# Patient Record
Sex: MALE | Race: WHITE | NOT HISPANIC OR LATINO | Employment: FULL TIME | URBAN - METROPOLITAN AREA
[De-identification: names, ages, dates, MRNs, and addresses within clinical notes are randomized per-mention and may not be internally consistent; named-entity substitution may affect disease eponyms.]

---

## 2017-04-11 ENCOUNTER — RECORDS - HEALTHEAST (OUTPATIENT)
Dept: LAB | Facility: CLINIC | Age: 57
End: 2017-04-11

## 2017-04-11 LAB
CHOLEST SERPL-MCNC: 218 MG/DL
FASTING STATUS PATIENT QL REPORTED: YES
HDLC SERPL-MCNC: 44 MG/DL
LDLC SERPL CALC-MCNC: 151 MG/DL
TRIGL SERPL-MCNC: 115 MG/DL

## 2018-04-23 ENCOUNTER — RECORDS - HEALTHEAST (OUTPATIENT)
Dept: LAB | Facility: CLINIC | Age: 58
End: 2018-04-23

## 2018-04-23 LAB
ALBUMIN SERPL-MCNC: 3.6 G/DL (ref 3.5–5)
ALP SERPL-CCNC: 68 U/L (ref 45–120)
ALT SERPL W P-5'-P-CCNC: 21 U/L (ref 0–45)
ANION GAP SERPL CALCULATED.3IONS-SCNC: 11 MMOL/L (ref 5–18)
AST SERPL W P-5'-P-CCNC: 19 U/L (ref 0–40)
BILIRUB SERPL-MCNC: 1.4 MG/DL (ref 0–1)
BUN SERPL-MCNC: 25 MG/DL (ref 8–22)
CALCIUM SERPL-MCNC: 9.3 MG/DL (ref 8.5–10.5)
CHLORIDE BLD-SCNC: 110 MMOL/L (ref 98–107)
CHOLEST SERPL-MCNC: 188 MG/DL
CO2 SERPL-SCNC: 22 MMOL/L (ref 22–31)
CREAT SERPL-MCNC: 0.75 MG/DL (ref 0.7–1.3)
FASTING STATUS PATIENT QL REPORTED: NORMAL
GFR SERPL CREATININE-BSD FRML MDRD: >60 ML/MIN/1.73M2
GLUCOSE BLD-MCNC: 150 MG/DL (ref 70–125)
HDLC SERPL-MCNC: 51 MG/DL
LDLC SERPL CALC-MCNC: 120 MG/DL
POTASSIUM BLD-SCNC: 4.3 MMOL/L (ref 3.5–5)
PROT SERPL-MCNC: 6.3 G/DL (ref 6–8)
SODIUM SERPL-SCNC: 143 MMOL/L (ref 136–145)
TRIGL SERPL-MCNC: 86 MG/DL

## 2019-05-31 ENCOUNTER — RECORDS - HEALTHEAST (OUTPATIENT)
Dept: LAB | Facility: CLINIC | Age: 59
End: 2019-05-31

## 2019-05-31 LAB
ALBUMIN SERPL-MCNC: 3.9 G/DL (ref 3.5–5)
ALP SERPL-CCNC: 63 U/L (ref 45–120)
ALT SERPL W P-5'-P-CCNC: 14 U/L (ref 0–45)
AST SERPL W P-5'-P-CCNC: 13 U/L (ref 0–40)
BILIRUB DIRECT SERPL-MCNC: 0.8 MG/DL
BILIRUB SERPL-MCNC: 3.4 MG/DL (ref 0–1)
PROT SERPL-MCNC: 6.3 G/DL (ref 6–8)

## 2019-06-01 ENCOUNTER — ANESTHESIA - HEALTHEAST (OUTPATIENT)
Dept: SURGERY | Facility: HOSPITAL | Age: 59
End: 2019-06-01

## 2019-06-01 ENCOUNTER — SURGERY - HEALTHEAST (OUTPATIENT)
Dept: SURGERY | Facility: HOSPITAL | Age: 59
End: 2019-06-01

## 2019-06-01 LAB — BACTERIA SPEC CULT: NO GROWTH

## 2019-06-01 ASSESSMENT — MIFFLIN-ST. JEOR
SCORE: 1542.01
SCORE: 1600.97

## 2019-06-02 ASSESSMENT — MIFFLIN-ST. JEOR: SCORE: 1578.29

## 2019-06-06 ENCOUNTER — OFFICE VISIT - HEALTHEAST (OUTPATIENT)
Dept: SURGERY | Facility: CLINIC | Age: 59
End: 2019-06-06

## 2019-06-06 DIAGNOSIS — Z48.89 POSTOPERATIVE VISIT: ICD-10-CM

## 2019-06-13 ENCOUNTER — OFFICE VISIT - HEALTHEAST (OUTPATIENT)
Dept: SURGERY | Facility: CLINIC | Age: 59
End: 2019-06-13

## 2019-06-13 DIAGNOSIS — Z48.89 POSTOPERATIVE VISIT: ICD-10-CM

## 2019-12-23 ENCOUNTER — RECORDS - HEALTHEAST (OUTPATIENT)
Dept: LAB | Facility: CLINIC | Age: 59
End: 2019-12-23

## 2019-12-23 LAB — POTASSIUM BLD-SCNC: 3.9 MMOL/L (ref 3.5–5)

## 2020-09-01 ENCOUNTER — AMBULATORY - HEALTHEAST (OUTPATIENT)
Dept: ADMINISTRATIVE | Facility: CLINIC | Age: 60
End: 2020-09-01

## 2020-09-01 ENCOUNTER — RECORDS - HEALTHEAST (OUTPATIENT)
Dept: LAB | Facility: CLINIC | Age: 60
End: 2020-09-01

## 2020-09-01 DIAGNOSIS — K43.9 VENTRAL HERNIA WITHOUT OBSTRUCTION OR GANGRENE: ICD-10-CM

## 2020-09-01 LAB
ALBUMIN SERPL-MCNC: 3.8 G/DL (ref 3.5–5)
ALP SERPL-CCNC: 77 U/L (ref 45–120)
ALT SERPL W P-5'-P-CCNC: 14 U/L (ref 0–45)
ANION GAP SERPL CALCULATED.3IONS-SCNC: 9 MMOL/L (ref 5–18)
AST SERPL W P-5'-P-CCNC: 15 U/L (ref 0–40)
BILIRUB SERPL-MCNC: 0.9 MG/DL (ref 0–1)
BUN SERPL-MCNC: 18 MG/DL (ref 8–22)
CALCIUM SERPL-MCNC: 9.3 MG/DL (ref 8.5–10.5)
CHLORIDE BLD-SCNC: 107 MMOL/L (ref 98–107)
CHOLEST SERPL-MCNC: 212 MG/DL
CO2 SERPL-SCNC: 24 MMOL/L (ref 22–31)
CREAT SERPL-MCNC: 0.82 MG/DL (ref 0.7–1.3)
FASTING STATUS PATIENT QL REPORTED: ABNORMAL
GFR SERPL CREATININE-BSD FRML MDRD: >60 ML/MIN/1.73M2
GLUCOSE BLD-MCNC: 160 MG/DL (ref 70–125)
HDLC SERPL-MCNC: 55 MG/DL
LDLC SERPL CALC-MCNC: 141 MG/DL
POTASSIUM BLD-SCNC: 4.2 MMOL/L (ref 3.5–5)
PROT SERPL-MCNC: 6.3 G/DL (ref 6–8)
SODIUM SERPL-SCNC: 140 MMOL/L (ref 136–145)
TRIGL SERPL-MCNC: 81 MG/DL

## 2020-09-16 ENCOUNTER — AMBULATORY - HEALTHEAST (OUTPATIENT)
Dept: SURGERY | Facility: AMBULATORY SURGERY CENTER | Age: 60
End: 2020-09-16

## 2020-09-16 ENCOUNTER — OFFICE VISIT - HEALTHEAST (OUTPATIENT)
Dept: SURGERY | Facility: CLINIC | Age: 60
End: 2020-09-16

## 2020-09-16 DIAGNOSIS — K43.9 VENTRAL HERNIA WITHOUT OBSTRUCTION OR GANGRENE: ICD-10-CM

## 2020-09-16 DIAGNOSIS — Z11.59 ENCOUNTER FOR SCREENING FOR OTHER VIRAL DISEASES: ICD-10-CM

## 2020-09-21 ENCOUNTER — AMBULATORY - HEALTHEAST (OUTPATIENT)
Dept: FAMILY MEDICINE | Facility: CLINIC | Age: 60
End: 2020-09-21

## 2020-09-21 DIAGNOSIS — Z11.59 ENCOUNTER FOR SCREENING FOR OTHER VIRAL DISEASES: ICD-10-CM

## 2020-09-23 ENCOUNTER — COMMUNICATION - HEALTHEAST (OUTPATIENT)
Dept: SCHEDULING | Facility: CLINIC | Age: 60
End: 2020-09-23

## 2020-09-23 ENCOUNTER — ANESTHESIA - HEALTHEAST (OUTPATIENT)
Dept: SURGERY | Facility: AMBULATORY SURGERY CENTER | Age: 60
End: 2020-09-23

## 2020-09-23 ASSESSMENT — MIFFLIN-ST. JEOR: SCORE: 1551.18

## 2020-09-24 ENCOUNTER — SURGERY - HEALTHEAST (OUTPATIENT)
Dept: SURGERY | Facility: AMBULATORY SURGERY CENTER | Age: 60
End: 2020-09-24

## 2020-09-24 ASSESSMENT — MIFFLIN-ST. JEOR: SCORE: 1551.18

## 2021-04-27 ENCOUNTER — RECORDS - HEALTHEAST (OUTPATIENT)
Dept: LAB | Facility: CLINIC | Age: 61
End: 2021-04-27

## 2021-04-27 LAB
SARS-COV-2 PCR COMMENT: NORMAL
SARS-COV-2 RNA SPEC QL NAA+PROBE: NEGATIVE
SARS-COV-2 VIRUS SPECIMEN SOURCE: NORMAL

## 2021-05-28 ENCOUNTER — RECORDS - HEALTHEAST (OUTPATIENT)
Dept: ADMINISTRATIVE | Facility: CLINIC | Age: 61
End: 2021-05-28

## 2021-05-29 NOTE — ANESTHESIA CARE TRANSFER NOTE
Last vitals:   Vitals:    06/01/19 1930   BP: 129/79   Pulse: (!) 113   Resp: 24   Temp: 37.3  C (99.1  F)   SpO2: 100%     Patient's level of consciousness is drowsy  Spontaneous respirations: yes  Maintains airway independently: yes  Dentition unchanged: yes  Oropharynx: oropharynx clear of all foreign objects    QCDR Measures:  ASA# 20 - Surgical Safety Checklist: WHO surgical safety checklist completed prior to induction    PQRS# 430 - Adult PONV Prevention: 4558F - Pt received => 2 anti-emetic agents (different classes) preop & intraop  ASA# 8 - Peds PONV Prevention: NA - Not pediatric patient, not GA or 2 or more risk factors NOT present  PQRS# 424 - Almita-op Temp Management: 4559F - At least one body temp DOCUMENTED => 35.5C or 95.9F within required timeframe  PQRS# 426 - PACU Transfer Protocol: - Transfer of care checklist used  ASA# 14 - Acute Post-op Pain: ASA14B - Patient did NOT experience pain >= 7 out of 10

## 2021-05-29 NOTE — ANESTHESIA PREPROCEDURE EVALUATION
Anesthesia Evaluation      Patient summary reviewed   No history of anesthetic complications     Airway   Mallampati: III  Neck ROM: full   Pulmonary - negative ROS and normal exam    breath sounds clear to auscultation                         Cardiovascular - normal exam  Exercise tolerance: > or = 4 METS  ECG reviewed  Rhythm: regular  Rate: normal,         Neuro/Psych - negative ROS     Endo/Other - negative ROS      GI/Hepatic/Renal - negative ROS     Comments: Perforated appy     Other findings: Last at breakfast at 8am      Dental    (+) poor dentition and chipped                       Anesthesia Plan  Planned anesthetic: general endotracheal  -- RSI with Succ  -- PONV prophylaxis with Decadron 10 mg and Zofran 4 mg  -- toradol if ok with surgeon    ASA 2   Induction: intravenous   Anesthetic plan and risks discussed with: patient and spouse  Anesthesia plan special considerations: rapid sequence induction, antiemetics,   Post-op plan: routine recovery

## 2021-05-29 NOTE — ANESTHESIA POSTPROCEDURE EVALUATION
Patient: Bishnu Tam  APPENDECTOMY, LAPAROSCOPIC  Anesthesia type: general    Patient location: PACU  Last vitals:   Vitals Value Taken Time   /64 6/1/2019  9:42 PM   Temp 36.9  C (98.5  F) 6/1/2019  9:42 PM   Pulse 111 6/1/2019  9:42 PM   Resp 16 6/1/2019  9:42 PM   SpO2 95 % 6/1/2019  9:42 PM     Post vital signs: stable  Level of consciousness: awake and responds to simple questions  Post-anesthesia pain: pain controlled  Post-anesthesia nausea and vomiting: no  Pulmonary: unassisted, return to baseline  Cardiovascular: stable and blood pressure at baseline  Hydration: adequate  Anesthetic events: no    QCDR Measures:  ASA# 11 - Almita-op Cardiac Arrest: ASA11B - Patient did NOT experience unanticipated cardiac arrest  ASA# 12 - Almita-op Mortality Rate: ASA12B - Patient did NOT die  ASA# 13 - PACU Re-Intubation Rate: ASA13B - Patient did NOT require a new airway mgmt  ASA# 10 - Composite Anes Safety: ASA10A - No serious adverse event    Additional Notes:

## 2021-05-29 NOTE — PROGRESS NOTES
HPI: Pt is here for follow up of a appendectomy that was significant with necrosis.   he is doing well.  Pain is well controlled.  No difficulties with the surgical wound/wounds.  he is eating well and denies fever and chills.         /62 (Patient Site: Right Arm, Patient Position: Sitting, Cuff Size: Adult Small)   Pulse 66   Temp 97.5  F (36.4  C) (Tympanic)   Wt 159 lb (72.1 kg)   SpO2 98%   BMI 22.81 kg/m      EXAM:  GENERAL:Appears well  ABDOMEN:  Soft, +BS  SURGICAL WOUNDS:  Incisions healing well, no enduration or drainage.    Final Diagnosis   APPENDIX, APPENDECTOMY:     1) MARKED ACUTE APPENDICITIS AND PERIAPPENDICITIS WITH TRANSMURAL NECROSIS,         MESENTERIC ABSCESS FORMATION AND TRANSMURAL PERFORATION     2) NO EVIDENCE OF DYSPLASIA OR MALIGNANCY         Assessment/Plan: . Doing well after surgery and should follow up as needed.  Alycia Valente PA-C  [facility] Department of Surgery

## 2021-05-29 NOTE — PROGRESS NOTES
HPI: Pt is here for follow up to have his JOHN PAUL drain removed.  He reports that he has been emptying it regularly but is not sure how much is been coming out.  States the fluid has cleared up and is more yellow.  He is eating well and reports no issues with incisions. He denies fever, chills, nausea, vomiting, bowel issues, and urinary difficulties.      /64 (Patient Site: Right Arm, Patient Position: Sitting, Cuff Size: Adult Small)   Pulse 68   Temp 98.2  F (36.8  C)   Wt 169 lb (76.7 kg)   SpO2 97%   BMI 24.25 kg/m      EXAM:  GENERAL:Appears well  ABDOMEN:  Soft, +BS  SURGICAL WOUNDS:  Incisions healing well, no enduration or drainage; JOHN PAUL drain noted with small amount of serous fluid      Assessment/Plan: JOHN PAUL drain pulled without incident and patient tolerated well.  He should continue on his antibiotic and be seen as scheduled for a full postop visit next week.    Marion Greer , [facility]

## 2021-05-30 ENCOUNTER — RECORDS - HEALTHEAST (OUTPATIENT)
Dept: ADMINISTRATIVE | Facility: CLINIC | Age: 61
End: 2021-05-30

## 2021-05-31 ENCOUNTER — RECORDS - HEALTHEAST (OUTPATIENT)
Dept: ADMINISTRATIVE | Facility: CLINIC | Age: 61
End: 2021-05-31

## 2021-06-02 VITALS — BODY MASS INDEX: 24.25 KG/M2 | WEIGHT: 169 LBS

## 2021-06-02 VITALS — HEIGHT: 70 IN | BODY MASS INDEX: 24.05 KG/M2 | WEIGHT: 168 LBS

## 2021-06-03 VITALS — BODY MASS INDEX: 22.81 KG/M2 | WEIGHT: 159 LBS

## 2021-06-04 VITALS — HEIGHT: 70 IN | BODY MASS INDEX: 23.48 KG/M2 | WEIGHT: 164 LBS

## 2021-06-04 VITALS — WEIGHT: 167 LBS | SYSTOLIC BLOOD PRESSURE: 128 MMHG | BODY MASS INDEX: 23.96 KG/M2 | DIASTOLIC BLOOD PRESSURE: 74 MMHG

## 2021-06-11 NOTE — ANESTHESIA POSTPROCEDURE EVALUATION
Patient: Bishnu Tam  Procedure(s):  HERNIORRHAPHY, VENTRAL, LAPAROSCOPIC  Anesthesia type: general    Patient location: Phase II Recovery  Last vitals:   Vitals Value Taken Time   /64 9/24/2020  1:20 PM   Temp 36.4  C (97.6  F) 9/24/2020 12:18 PM   Pulse 68 9/24/2020  1:36 PM   Resp 16 9/24/2020 12:18 PM   SpO2 96 % 9/24/2020  1:36 PM   Vitals shown include unvalidated device data.  Post vital signs: stable  Level of consciousness: awake and responds to simple questions  Post-anesthesia pain: pain controlled  Post-anesthesia nausea and vomiting: no  Pulmonary: unassisted, return to baseline  Cardiovascular: stable and blood pressure at baseline  Hydration: adequate  Anesthetic events: no    QCDR Measures:  ASA# 11 - Almita-op Cardiac Arrest: ASA11B - Patient did NOT experience unanticipated cardiac arrest  ASA# 12 - Almita-op Mortality Rate: ASA12B - Patient did NOT die  ASA# 13 - PACU Re-Intubation Rate: ASA13B - Patient did NOT require a new airway mgmt  ASA# 10 - Composite Anes Safety: ASA10A - No serious adverse event    Additional Notes:

## 2021-06-11 NOTE — ANESTHESIA CARE TRANSFER NOTE
Last vitals:   Vitals:    09/24/20 1218   BP: 168/77   Pulse: 91   Resp: 16   Temp: 36.4  C (97.6  F)   SpO2: 99%     Patient's level of consciousness is drowsy  Spontaneous respirations: yes  Maintains airway independently: yes  Dentition unchanged: yes  Oropharynx: oropharynx clear of all foreign objects    QCDR Measures:  ASA# 20 - Surgical Safety Checklist: WHO surgical safety checklist completed prior to induction    PQRS# 430 - Adult PONV Prevention: 4558F - Pt received => 2 anti-emetic agents (different classes) preop & intraop  ASA# 8 - Peds PONV Prevention: NA - Not pediatric patient, not GA or 2 or more risk factors NOT present  PQRS# 424 - Almita-op Temp Management: 4559F - At least one body temp DOCUMENTED => 35.5C or 95.9F within required timeframe  PQRS# 426 - PACU Transfer Protocol: - Transfer of care checklist used  ASA# 14 - Acute Post-op Pain: ASA14B - Patient did NOT experience pain >= 7 out of 10

## 2021-06-11 NOTE — PROGRESS NOTES
GENERAL SURGICAL CONSULTATION    I was requested by Tonny Arthur MD to consult on this pt to evaluate them for a ventral hernia    HPI:  This is a 60 y.o. male here today with swelling just above the umbilicus for the last 6 months.  It does not hurt.  It does seem to be getting bigger.     Allergies:Patient has no known allergies.    Past Medical History:   Diagnosis Date     Appendicitis 06/01/2019     Diabetes mellitus (H)      DVT (deep vein thrombosis) in pregnancy (H)      Hypertension        Past Surgical History:   Procedure Laterality Date     APPENDECTOMY  06/01/2019     [place] LAP,APPENDECTOMY N/A 6/1/2019    Procedure: APPENDECTOMY, LAPAROSCOPIC;  Surgeon: Josef Em MD;  Location: [facility] [place];  Service: General       CURRENT MEDS:  Current Outpatient Medications   Medication Sig Dispense Refill     lisinopril (PRINIVIL,ZESTRIL) 2.5 MG tablet Take 2.5 mg by mouth daily.       metFORMIN (GLUCOPHAGE-XR) 500 MG 24 hr tablet Take 500 mg by mouth daily with breakfast.       No current facility-administered medications for this visit.        Family History   Problem Relation Age of Onset     Diabetes Mother      Family history is not pertinent to this patients Chief Complaint.     reports that he has never smoked. He has never used smokeless tobacco. He reports current alcohol use of about 6.0 standard drinks of alcohol per week. He reports that he does not use drugs.    Review of Systems -   10 point Review of systems is negative except for; as mentioned above in HPI and PMHx    /74 (Patient Site: Right Arm, Patient Position: Sitting, Cuff Size: Adult Regular)   Wt 167 lb (75.8 kg)   BMI 23.96 kg/m    EXAM:  GENERAL: Well developed male  HEENT: EOMI, Anicteric Sclera, Moist Mucous Membranes,  In Mouth the pt does not have redness or bleeding gums  CARDIOVASCULAR: RRR w/out murmur   CHEST/LUNG: Clear to Auscultation  ABDOMEN:  Non tender to palpation, +BS, palpable mass that is soft  just above the umbilicus  MUSCULOSKELETAL:  No deformities with good range of motion in all extremities  NEURO: He is ambulatory with good strength in both legs.  HEME/LYMPH: No Cervical Adenopathy or tenderness.     IMAGES:  none    Assessment/Plan:  Midline ventral hernia.    Laparoscopic Ventral hernia repair.      Josef Em MD  [facility]  [phone]

## 2021-06-16 PROBLEM — K43.9 VENTRAL HERNIA WITHOUT OBSTRUCTION OR GANGRENE: Status: ACTIVE | Noted: 2020-09-16

## 2021-06-16 PROBLEM — Z86.718 PERSONAL HISTORY OF DVT (DEEP VEIN THROMBOSIS): Status: ACTIVE | Noted: 2019-06-01

## 2021-06-16 PROBLEM — I10 ESSENTIAL HYPERTENSION, BENIGN: Status: ACTIVE | Noted: 2019-06-01

## 2021-06-16 PROBLEM — E11.9 TYPE 2 DIABETES MELLITUS WITHOUT COMPLICATION, WITHOUT LONG-TERM CURRENT USE OF INSULIN (H): Status: ACTIVE | Noted: 2019-06-01

## 2021-06-16 PROBLEM — K35.32 ACUTE PERFORATED APPENDICITIS: Status: ACTIVE | Noted: 2019-06-01

## 2021-06-26 ENCOUNTER — HEALTH MAINTENANCE LETTER (OUTPATIENT)
Age: 61
End: 2021-06-26

## 2021-07-03 NOTE — ANESTHESIA PREPROCEDURE EVALUATION
Anesthesia Preprocedure Evaluation by Macario Woods MD at 9/24/2020  9:59 AM     Author: Macario Woods MD Service: -- Author Type: Physician    Filed: 9/24/2020  9:59 AM Date of Service: 9/24/2020  9:59 AM Status: Signed    : Macario Woods MD (Physician)       Anesthesia Evaluation      Patient summary reviewed   No history of anesthetic complications     Airway   Mallampati: II  Neck ROM: full   Pulmonary - negative ROS and normal exam                          Cardiovascular - normal exam  (+) hypertension well controlled, ,      Neuro/Psych - negative ROS     Endo/Other    (+) diabetes mellitus (A1C: 6.7) type 2 well controlled,      GI/Hepatic/Renal - negative ROS           Dental    (+) chipped                           Anesthesia Plan  Planned anesthetic: general endotracheal  Versed/fentanyl/propofol/carol  Ketamine PRN  Decadron/zofran    ASA 2   Induction: intravenous   Anesthetic plan and risks discussed with: patient  Anesthesia plan special considerations: antiemetics,   Post-op plan: routine recovery      Results for orders placed or performed in visit on 09/21/20   COVID-19 Virus PCR MRF    Specimen: Respiratory   Result Value Ref Range    COVID-19 VIRUS SPECIMEN SOURCE Nasopharyngeal     2019-nCOV Not Detected        Chemistry        Component Value Date/Time     09/01/2020 0854    K 4.2 09/01/2020 0854     09/01/2020 0854    CO2 24 09/01/2020 0854    BUN 18 09/01/2020 0854    CREATININE 0.82 09/01/2020 0854     (H) 09/01/2020 0854        Component Value Date/Time    CALCIUM 9.3 09/01/2020 0854    ALKPHOS 77 09/01/2020 0854    AST 15 09/01/2020 0854    ALT 14 09/01/2020 0854    BILITOT 0.9 09/01/2020 0854        Lab Results   Component Value Date    WBC 7.0 06/02/2019    HGB 13.2 (L) 06/02/2019    HCT 37.8 (L) 06/02/2019    MCV 87 06/02/2019     06/02/2019

## 2021-10-16 ENCOUNTER — HEALTH MAINTENANCE LETTER (OUTPATIENT)
Age: 61
End: 2021-10-16

## 2022-02-05 ENCOUNTER — HEALTH MAINTENANCE LETTER (OUTPATIENT)
Age: 62
End: 2022-02-05

## 2022-07-02 ENCOUNTER — HOSPITAL ENCOUNTER (EMERGENCY)
Facility: HOSPITAL | Age: 62
Discharge: HOME | End: 2022-07-02
Attending: EMERGENCY MEDICINE | Admitting: EMERGENCY MEDICINE
Payer: COMMERCIAL

## 2022-07-02 ENCOUNTER — APPOINTMENT (OUTPATIENT)
Dept: ULTRASOUND IMAGING | Facility: HOSPITAL | Age: 62
End: 2022-07-02
Attending: EMERGENCY MEDICINE
Payer: COMMERCIAL

## 2022-07-02 VITALS
HEIGHT: 70 IN | BODY MASS INDEX: 24.05 KG/M2 | OXYGEN SATURATION: 99 % | SYSTOLIC BLOOD PRESSURE: 147 MMHG | RESPIRATION RATE: 18 BRPM | HEART RATE: 84 BPM | WEIGHT: 168 LBS | TEMPERATURE: 97.8 F | DIASTOLIC BLOOD PRESSURE: 72 MMHG

## 2022-07-02 DIAGNOSIS — I82.4Z2 ACUTE DEEP VEIN THROMBOSIS (DVT) OF DISTAL VEIN OF LEFT LOWER EXTREMITY (H): ICD-10-CM

## 2022-07-02 PROBLEM — C43.59 MALIGNANT MELANOMA OF UPPER BACK (H): Status: ACTIVE | Noted: 2019-12-27

## 2022-07-02 PROCEDURE — 93971 EXTREMITY STUDY: CPT | Mod: LT

## 2022-07-02 PROCEDURE — 99284 EMERGENCY DEPT VISIT MOD MDM: CPT | Mod: 25

## 2022-07-02 RX ORDER — APIXABAN 5 MG (74)
KIT ORAL
Qty: 74 EACH | Refills: 0 | Status: SHIPPED | OUTPATIENT
Start: 2022-07-02 | End: 2022-08-01

## 2022-07-02 NOTE — ED TRIAGE NOTES
Pt c/o left leg pin and swelling. History of blood clot and not on thinner. No c/o SOB or CP.     Triage Assessment     Row Name 07/02/22 0638       Triage Assessment (Adult)    Airway WDL WDL       Respiratory WDL    Respiratory WDL WDL       Cognitive/Neuro/Behavioral WDL    Cognitive/Neuro/Behavioral WDL WDL

## 2022-07-02 NOTE — DISCHARGE INSTRUCTIONS
Please try to elevate the leg for the next few days to a week, off and on throughout the day.  This will help decrease the swelling and any discomfort associated with that.    You take Tylenol, use heat pads or ice, whichever is more comfortable.  Do not take ibuprofen while on a blood thinner.    If you fall and hit your head on a blood thinner you to come back to emergency department for reevaluation.

## 2022-07-02 NOTE — ED PROVIDER NOTES
EMERGENCY DEPARTMENT ENCOUNTER      NAME: Bishnu Tam  AGE: 62 year old male  YOB: 1960  MRN: [id number]  EVALUATION DATE & TIME: 7/2/2022  6:40 AM    PCP: Tonny Arthur    ED PROVIDER: Celestino Ospina M.D.      Chief Complaint   Patient presents with     Leg Swelling         FINAL IMPRESSION:  1. Acute deep vein thrombosis (DVT) of distal vein of left lower extremity (H)          ED COURSE & MEDICAL DECISION MAKING:    Pertinent Labs & Imaging studies reviewed. (See chart for details)  ED Course as of 07/02/22 0757   Sat Jul 02, 2022   0649 Patient is a 62-year-old gentleman with a history of do not provoked DVTs, family history of DVTs, presents with less than 24 hours of discomfort and swelling in his left calf, would like to make sure that it is not DVT.  On exam he does have 1+ edema in the left lower leg compared to the right which is normal.  Ultrasound ordered   0749 I spoke to Dr. Everett: DVT from prox thigh to the ankle. Becomes occlusive in distal thigh.   I updated the patient, sent home with Eliquis, we discussed pain management techniques if his leg becomes more swollen.  He will follow-up with his primary care doctor, who can help with hematology referral.  This is his third unprovoked DVT.      Additional ED Course Timestamps:  6:46 AM I met with the patient, obtained history, performed an initial exam, and discussed options and plan for diagnostics and treatment here in the ED.   7:49 AM Spoke with radiology.   7:50 AM Checked in on and updated patient.     At the conclusion of the encounter I discussed the results of all of the tests and the disposition. The questions were answered. The patient or family acknowledged understanding and was agreeable with the care plan.         MEDICATIONS GIVEN IN THE EMERGENCY:  Medications - No data to display      NEW PRESCRIPTIONS STARTED AT TODAY'S ER VISIT  New Prescriptions    APIXABAN STARTER PACK (ELIQUIS DVT/PE STARTER PACK) 5 MG  TBPK    Take 10 mg by mouth 2 times daily for 7 days, THEN 5 mg 2 times daily for 23 days.          =================================================================    HPI    Patient information was obtained from: Patient    Use of : N/A         Bishnu Tam is a 62 year old male with a pertinent history of type 2 diabetes mellitus, hypertension, DVT, and blood clots who presents to this ED for evaluation of leg swelling.    Since 2022 (yesterday), patient has been experiencing left leg pain and swelling. Denies any recent exercise or cause for this. Patient denies being on blood thinners. Endorses having a history of blood clots, and had his last one in . States blood clots run in the family, and his uncle  of one. Patient denies any other complaints at this time.     REVIEW OF SYSTEMS   Review of Systems   Cardiovascular: Positive for leg swelling (and pain - left).   All other systems reviewed and are negative.       PAST MEDICAL HISTORY:  Past Medical History:   Diagnosis Date     Appendicitis 2019     Diabetes mellitus (H)      History of blood clots      Hypertension        PAST SURGICAL HISTORY:  Past Surgical History:   Procedure Laterality Date     APPENDECTOMY  2019     HERNIA REPAIR      Umb      OTHER SURGICAL HISTORY      melanoma     [place] LAP, VENTRAL HERNIA REPAIR,REDUCIBLE N/A 2020    Procedure: HERNIORRHAPHY, VENTRAL, LAPAROSCOPIC;  Surgeon: Josef Em MD;  Location: [facility];  Service: General     [place] LAP,APPENDECTOMY N/A 2019    Procedure: APPENDECTOMY, LAPAROSCOPIC;  Surgeon: Josef Em MD;  Location: [facility] [place];  Service: General           CURRENT MEDICATIONS:    No current facility-administered medications for this encounter.     Current Outpatient Medications   Medication     Apixaban Starter Pack (ELIQUIS DVT/PE STARTER PACK) 5 MG TBPK     HYDROcodone-acetaminophen 5-325 mg per tablet     lisinopril  "(PRINIVIL,ZESTRIL) 2.5 MG tablet     metFORMIN (GLUCOPHAGE-XR) 500 MG 24 hr tablet       ALLERGIES:  No Known Allergies    FAMILY HISTORY:  Family History   Problem Relation Age of Onset     Diabetes Mother        SOCIAL HISTORY:   Social History     Socioeconomic History     Marital status:      Spouse name: None     Number of children: None     Years of education: None     Highest education level: None   Tobacco Use     Smoking status: Never Smoker     Smokeless tobacco: Never Used   Substance and Sexual Activity     Alcohol use: Yes     Alcohol/week: 6.0 standard drinks     Drug use: Never       VITALS:  BP (!) 147/72   Pulse 84   Temp 97.8  F (36.6  C) (Tympanic)   Resp 18   Ht 1.765 m (5' 9.5\")   Wt 76.2 kg (168 lb)   SpO2 99%   BMI 24.45 kg/m      PHYSICAL EXAM    Constitutional: Well developed, well nourished. Comfortable appearing.  HENT: Normocephalic, atraumatic, mucous membranes moist, nose normal. Neck- Supple, gross ROM intact.   Eyes: Pupils mid-range, conjunctiva without injection, no discharge.   Respiratory: Clear to auscultation bilaterally, no respiratory distress, no wheezing, speaks full sentences easily. No cough.  Cardiovascular: Tachycardic. Regular rhythm, no murmurs. No pedal edema, 2+ DP pulses.   Musculoskeletal: Moving all 4 extremities intentionally and without pain. No obvious deformity. 1+ pitting edema of left ankle and foot.   Skin: Warm, dry, no rash.  Neurologic: Alert & oriented x 3, cranial nerves grossly intact.  Psychiatric: Affect normal, cooperative.       LAB:  All pertinent labs reviewed and interpreted.  Labs Ordered and Resulted from Time of ED Arrival to Time of ED Departure - No data to display    RADIOLOGY:  Reviewed all pertinent imaging. Please see official radiology report.  US Lower Extremity Venous Duplex Left   Final Result   IMPRESSION:   1.  Left leg deep venous thrombosis from the proximal thigh to the ankle.      2.  Report was discussed with " Dr. Celestino Ospina 7/2/2022 0749 hours.            I, Sharon Ritchie, am serving as a scribe to document services personally performed by Dr. Celestino Ospina based on my observation and the provider's statements to me. I, Celestino Ospina MD attest that Sharon Dugan is acting in a scribe capacity, has observed my performance of the services and has documented them in accordance with my direction.    Celestino Ospina M.D.  Emergency Medicine  [facility] EMERGENCY DEPARTMENT  [address] [facility] [address]  [phone]  Dept: [phone]     Celestino Ospina MD  07/02/22 0756

## 2022-07-08 ENCOUNTER — LAB REQUISITION (OUTPATIENT)
Dept: LAB | Facility: CLINIC | Age: 62
End: 2022-07-08

## 2022-07-08 DIAGNOSIS — Z12.5 ENCOUNTER FOR SCREENING FOR MALIGNANT NEOPLASM OF PROSTATE: ICD-10-CM

## 2022-07-08 DIAGNOSIS — E78.5 HYPERLIPIDEMIA, UNSPECIFIED: ICD-10-CM

## 2022-07-08 LAB
ALBUMIN SERPL BCG-MCNC: 4.2 G/DL (ref 3.5–5.2)
ALP SERPL-CCNC: 86 U/L (ref 40–129)
ALT SERPL W P-5'-P-CCNC: 15 U/L (ref 10–50)
ANION GAP SERPL CALCULATED.3IONS-SCNC: 11 MMOL/L (ref 7–15)
AST SERPL W P-5'-P-CCNC: 16 U/L (ref 10–50)
BILIRUB SERPL-MCNC: 1 MG/DL
BUN SERPL-MCNC: 14.4 MG/DL (ref 8–23)
CALCIUM SERPL-MCNC: 9.7 MG/DL (ref 8.8–10.2)
CHLORIDE SERPL-SCNC: 105 MMOL/L (ref 98–107)
CHOLEST SERPL-MCNC: 186 MG/DL
CREAT SERPL-MCNC: 0.77 MG/DL (ref 0.67–1.17)
DEPRECATED HCO3 PLAS-SCNC: 25 MMOL/L (ref 22–29)
GFR SERPL CREATININE-BSD FRML MDRD: >90 ML/MIN/1.73M2
GLUCOSE SERPL-MCNC: 255 MG/DL (ref 70–99)
HDLC SERPL-MCNC: 42 MG/DL
LDLC SERPL CALC-MCNC: 120 MG/DL
NONHDLC SERPL-MCNC: 144 MG/DL
POTASSIUM SERPL-SCNC: 4 MMOL/L (ref 3.4–5.3)
PROT SERPL-MCNC: 6.3 G/DL (ref 6.4–8.3)
PSA SERPL-MCNC: 0.86 NG/ML (ref 0–4.5)
SODIUM SERPL-SCNC: 141 MMOL/L (ref 136–145)
TRIGL SERPL-MCNC: 119 MG/DL

## 2022-07-08 PROCEDURE — 80053 COMPREHEN METABOLIC PANEL: CPT | Performed by: FAMILY MEDICINE

## 2022-07-08 PROCEDURE — 80061 LIPID PANEL: CPT | Performed by: FAMILY MEDICINE

## 2022-07-08 PROCEDURE — G0103 PSA SCREENING: HCPCS | Performed by: FAMILY MEDICINE

## 2022-07-23 ENCOUNTER — HEALTH MAINTENANCE LETTER (OUTPATIENT)
Age: 62
End: 2022-07-23

## 2022-10-01 ENCOUNTER — HEALTH MAINTENANCE LETTER (OUTPATIENT)
Age: 62
End: 2022-10-01

## 2023-05-14 ENCOUNTER — HEALTH MAINTENANCE LETTER (OUTPATIENT)
Age: 63
End: 2023-05-14

## 2023-08-06 ENCOUNTER — HEALTH MAINTENANCE LETTER (OUTPATIENT)
Age: 63
End: 2023-08-06

## 2023-08-31 ENCOUNTER — LAB REQUISITION (OUTPATIENT)
Dept: LAB | Facility: CLINIC | Age: 63
End: 2023-08-31

## 2023-08-31 DIAGNOSIS — E78.5 HYPERLIPIDEMIA, UNSPECIFIED: ICD-10-CM

## 2023-08-31 DIAGNOSIS — E11.9 TYPE 2 DIABETES MELLITUS WITHOUT COMPLICATIONS (H): ICD-10-CM

## 2023-08-31 LAB
ALBUMIN SERPL BCG-MCNC: 4.3 G/DL (ref 3.5–5.2)
ALP SERPL-CCNC: 72 U/L (ref 40–129)
ALT SERPL W P-5'-P-CCNC: 26 U/L (ref 0–70)
ANION GAP SERPL CALCULATED.3IONS-SCNC: 11 MMOL/L (ref 7–15)
AST SERPL W P-5'-P-CCNC: 21 U/L (ref 0–45)
BILIRUB SERPL-MCNC: 1.4 MG/DL
BUN SERPL-MCNC: 18.9 MG/DL (ref 8–23)
CALCIUM SERPL-MCNC: 9.6 MG/DL (ref 8.8–10.2)
CHLORIDE SERPL-SCNC: 104 MMOL/L (ref 98–107)
CHOLEST SERPL-MCNC: 145 MG/DL
CREAT SERPL-MCNC: 0.9 MG/DL (ref 0.67–1.17)
DEPRECATED HCO3 PLAS-SCNC: 26 MMOL/L (ref 22–29)
GFR SERPL CREATININE-BSD FRML MDRD: >90 ML/MIN/1.73M2
GLUCOSE SERPL-MCNC: 166 MG/DL (ref 70–99)
HDLC SERPL-MCNC: 52 MG/DL
LDLC SERPL CALC-MCNC: 79 MG/DL
NONHDLC SERPL-MCNC: 93 MG/DL
POTASSIUM SERPL-SCNC: 4.5 MMOL/L (ref 3.4–5.3)
PROT SERPL-MCNC: 6.2 G/DL (ref 6.4–8.3)
SODIUM SERPL-SCNC: 141 MMOL/L (ref 136–145)
TRIGL SERPL-MCNC: 69 MG/DL

## 2023-08-31 PROCEDURE — 80053 COMPREHEN METABOLIC PANEL: CPT | Performed by: FAMILY MEDICINE

## 2023-08-31 PROCEDURE — 82570 ASSAY OF URINE CREATININE: CPT | Performed by: FAMILY MEDICINE

## 2023-08-31 PROCEDURE — 80061 LIPID PANEL: CPT | Performed by: FAMILY MEDICINE

## 2023-09-01 LAB
CREAT UR-MCNC: 132 MG/DL
MICROALBUMIN UR-MCNC: 135 MG/L
MICROALBUMIN/CREAT UR: 102.27 MG/G CR (ref 0–17)

## 2023-12-24 ENCOUNTER — HEALTH MAINTENANCE LETTER (OUTPATIENT)
Age: 63
End: 2023-12-24

## 2024-05-30 ENCOUNTER — MEDICAL CORRESPONDENCE (OUTPATIENT)
Dept: HEALTH INFORMATION MANAGEMENT | Facility: CLINIC | Age: 64
End: 2024-05-30
Payer: COMMERCIAL

## 2024-05-30 ENCOUNTER — TRANSFERRED RECORDS (OUTPATIENT)
Dept: HEALTH INFORMATION MANAGEMENT | Facility: CLINIC | Age: 64
End: 2024-05-30
Payer: COMMERCIAL

## 2024-05-30 ENCOUNTER — TRANSCRIBE ORDERS (OUTPATIENT)
Dept: OTHER | Age: 64
End: 2024-05-30

## 2024-05-30 DIAGNOSIS — K40.90 LEFT INGUINAL HERNIA: Primary | ICD-10-CM

## 2024-06-07 ENCOUNTER — OFFICE VISIT (OUTPATIENT)
Dept: SURGERY | Facility: CLINIC | Age: 64
End: 2024-06-07
Payer: COMMERCIAL

## 2024-06-07 VITALS — DIASTOLIC BLOOD PRESSURE: 78 MMHG | SYSTOLIC BLOOD PRESSURE: 138 MMHG | WEIGHT: 188.2 LBS | BODY MASS INDEX: 27.39 KG/M2

## 2024-06-07 DIAGNOSIS — K40.90 LEFT INGUINAL HERNIA: ICD-10-CM

## 2024-06-07 PROCEDURE — 99204 OFFICE O/P NEW MOD 45 MIN: CPT | Performed by: SURGERY

## 2024-06-07 RX ORDER — ATORVASTATIN CALCIUM 10 MG/1
1 TABLET, FILM COATED ORAL
COMMUNITY
Start: 2024-03-14

## 2024-06-07 RX ORDER — CEFAZOLIN SODIUM/WATER 2 G/20 ML
2 SYRINGE (ML) INTRAVENOUS SEE ADMIN INSTRUCTIONS
Status: CANCELLED | OUTPATIENT
Start: 2024-07-18

## 2024-06-07 RX ORDER — APIXABAN 5 MG/1
TABLET, FILM COATED ORAL
COMMUNITY
Start: 2024-06-03

## 2024-06-07 RX ORDER — CEFAZOLIN SODIUM/WATER 2 G/20 ML
2 SYRINGE (ML) INTRAVENOUS
Status: CANCELLED | OUTPATIENT
Start: 2024-07-18

## 2024-06-07 RX ORDER — ACETAMINOPHEN 325 MG/1
975 TABLET ORAL ONCE
Status: CANCELLED | OUTPATIENT
Start: 2024-07-18 | End: 2024-06-07

## 2024-06-07 NOTE — LETTER
6/7/2024      Bishnu Tam  [address] [address]      Dear Colleague,    Thank you for referring your patient, Bishnu Tam, to the [facility] [place]. Please see a copy of my visit note below.    I was asked to consult on this pt by Tonny Arthur for evaluation a hernia.    HPI:  This is a 64 year old male here today with concerns of pain and bulging in his Left groin. He has noted this for the past 3 years. The discomfort he is experiencing is a deep gnawing pain that is worse toward the end of the day.    Allergies:Patient has no known allergies.    Past Medical History:   Diagnosis Date     Appendicitis 06/01/2019     Diabetes mellitus (H)      History of blood clots      Hypertension        Past Surgical History:   Procedure Laterality Date     APPENDECTOMY  06/01/2019     HERNIA REPAIR  2008    Umb      OTHER SURGICAL HISTORY      melanoma     [place] LAP, VENTRAL HERNIA REPAIR,REDUCIBLE N/A 9/24/2020    Procedure: HERNIORRHAPHY, VENTRAL, LAPAROSCOPIC;  Surgeon: Josef Em MD;  Location: [facility];  Service: General     [place] LAP,APPENDECTOMY N/A 6/1/2019    Procedure: APPENDECTOMY, LAPAROSCOPIC;  Surgeon: Joesf Em MD;  Location: [facility];  Service: General       CURRENT MEDS:  Current Outpatient Medications   Medication Sig Dispense Refill     atorvastatin (LIPITOR) 10 MG tablet Take 1 tablet by mouth daily at 2 pm       ELIQUIS ANTICOAGULANT 5 MG tablet        lisinopril (PRINIVIL,ZESTRIL) 2.5 MG tablet [LISINOPRIL (PRINIVIL,ZESTRIL) 2.5 MG TABLET] Take 2.5 mg by mouth daily.       metFORMIN (GLUCOPHAGE-XR) 500 MG 24 hr tablet [METFORMIN (GLUCOPHAGE-XR) 500 MG 24 HR TABLET] Take 500 mg by mouth daily with breakfast.         Family History   Problem Relation Age of Onset     Diabetes Mother         reports that he has never smoked. He has never used smokeless tobacco. He reports current alcohol use of  about 6.0 standard drinks of alcohol per week. He reports that he does not use drugs.    Review of Systems -   10 point Review of systems is negative except for; as mentioned above in HPI and PMHx    /78 (BP Location: Right arm)   Wt 85.4 kg (188 lb 3.2 oz)   BMI 27.39 kg/m    Body mass index is 27.39 kg/m .    EXAM:  GENERAL: Well developed male  HEENT: EOMI, Anicteric Sclera  NECK:  No masses, good flexion and extention of the neck  CARDIAC: RRR w/out murmur   CHEST/LUNG: Clear  ABDOMEN: Left inguinal hernia.   GENITAL: Both testicles descended without masses  NEURO: He is ambulatory with good strength in both legs.    IMAGES: CT of Abdomin shows a large LIH with colon down in the hernia defect.    Assessment/Plan: Pt with a Left inguinal hernia. I discussed this at length with him.  I went over conservative management as well as surgical treatment for hernias.  Robotic Left Inguinal hernia.  We discussed the possibility of converting to an open surgery.   I went over the small risks of surgery including but not limited to bleeding and infection. I discussed the expected recovery time as well. We will schedule this hernia repair at his earliest convenience.    Josef Em MD  [facility]; [facility]  [phone]       Again, thank you for allowing me to participate in the care of your patient.        Sincerely,        Josef Em MD

## 2024-06-07 NOTE — PROGRESS NOTES
I was asked to consult on this pt by Tonny Arthur for evaluation a hernia.    HPI:  This is a 64 year old male here today with concerns of pain and bulging in his Left groin. He has noted this for the past 3 years. The discomfort he is experiencing is a deep gnawing pain that is worse toward the end of the day.    Allergies:Patient has no known allergies.    Past Medical History:   Diagnosis Date    Appendicitis 06/01/2019    Diabetes mellitus (H)     History of blood clots     Hypertension        Past Surgical History:   Procedure Laterality Date    APPENDECTOMY  06/01/2019    HERNIA REPAIR  2008    [facility]     OTHER SURGICAL HISTORY      melanoma    MO LAP, VENTRAL HERNIA REPAIR,REDUCIBLE N/A 9/24/2020    Procedure: HERNIORRHAPHY, VENTRAL, LAPAROSCOPIC;  Surgeon: Josef Em MD;  Location: [facility];  Service: General    MO LAP,APPENDECTOMY N/A 6/1/2019    Procedure: APPENDECTOMY, LAPAROSCOPIC;  Surgeon: Josef Em MD;  Location: [facility];  Service: General       CURRENT MEDS:  Current Outpatient Medications   Medication Sig Dispense Refill    atorvastatin (LIPITOR) 10 MG tablet Take 1 tablet by mouth daily at 2 pm      ELIQUIS ANTICOAGULANT 5 MG tablet       lisinopril (PRINIVIL,ZESTRIL) 2.5 MG tablet [LISINOPRIL (PRINIVIL,ZESTRIL) 2.5 MG TABLET] Take 2.5 mg by mouth daily.      metFORMIN (GLUCOPHAGE-XR) 500 MG 24 hr tablet [METFORMIN (GLUCOPHAGE-XR) 500 MG 24 HR TABLET] Take 500 mg by mouth daily with breakfast.         Family History   Problem Relation Age of Onset    Diabetes Mother         reports that he has never smoked. He has never used smokeless tobacco. He reports current alcohol use of about 6.0 standard drinks of alcohol per week. He reports that he does not use drugs.    Review of Systems -   10 point Review of systems is negative except for; as mentioned above in HPI and PMHx    /78 (BP Location: Right arm)   Wt 85.4 kg (188 lb 3.2 oz)   BMI 27.39 kg/m     Body mass index is 27.39 kg/m .    EXAM:  GENERAL: Well developed male  HEENT: EOMI, Anicteric Sclera  NECK:  No masses, good flexion and extention of the neck  CARDIAC: RRR w/out murmur   CHEST/LUNG: Clear  ABDOMEN: Left inguinal hernia.   GENITAL: Both testicles descended without masses  NEURO: He is ambulatory with good strength in both legs.    IMAGES: CT of Abdomin shows a large LIH with colon down in the hernia defect.    Assessment/Plan: Pt with a Left inguinal hernia. I discussed this at length with him.  I went over conservative management as well as surgical treatment for hernias.  Robotic Left Inguinal hernia.  We discussed the possibility of converting to an open surgery.   I went over the small risks of surgery including but not limited to bleeding and infection. I discussed the expected recovery time as well. We will schedule this hernia repair at his earliest convenience.    Josef Em MD  [facility]; [facility]  [phone]

## 2024-07-18 ENCOUNTER — ANESTHESIA EVENT (OUTPATIENT)
Dept: SURGERY | Facility: HOSPITAL | Age: 64
End: 2024-07-18
Payer: COMMERCIAL

## 2024-07-18 ENCOUNTER — HOSPITAL ENCOUNTER (OUTPATIENT)
Facility: HOSPITAL | Age: 64
Discharge: HOME | End: 2024-07-18
Attending: SURGERY | Admitting: SURGERY
Payer: COMMERCIAL

## 2024-07-18 ENCOUNTER — ANESTHESIA (OUTPATIENT)
Dept: SURGERY | Facility: HOSPITAL | Age: 64
End: 2024-07-18
Payer: COMMERCIAL

## 2024-07-18 VITALS
HEART RATE: 69 BPM | RESPIRATION RATE: 21 BRPM | TEMPERATURE: 98.3 F | OXYGEN SATURATION: 96 % | BODY MASS INDEX: 27.03 KG/M2 | DIASTOLIC BLOOD PRESSURE: 61 MMHG | WEIGHT: 185.7 LBS | SYSTOLIC BLOOD PRESSURE: 126 MMHG

## 2024-07-18 DIAGNOSIS — K40.90 INGUINAL HERNIA OF LEFT SIDE WITHOUT OBSTRUCTION OR GANGRENE: Primary | ICD-10-CM

## 2024-07-18 LAB
GLUCOSE BLDC GLUCOMTR-MCNC: 198 MG/DL (ref 70–99)
GLUCOSE BLDC GLUCOMTR-MCNC: 225 MG/DL (ref 70–99)

## 2024-07-18 PROCEDURE — 250N000009 HC RX 250: Performed by: NURSE ANESTHETIST, CERTIFIED REGISTERED

## 2024-07-18 PROCEDURE — 710N000009 HC RECOVERY PHASE 1, LEVEL 1, PER MIN: Performed by: SURGERY

## 2024-07-18 PROCEDURE — 82962 GLUCOSE BLOOD TEST: CPT

## 2024-07-18 PROCEDURE — 250N000013 HC RX MED GY IP 250 OP 250 PS 637: Performed by: SURGERY

## 2024-07-18 PROCEDURE — 250N000011 HC RX IP 250 OP 636: Performed by: SURGERY

## 2024-07-18 PROCEDURE — 250N000011 HC RX IP 250 OP 636: Performed by: ANESTHESIOLOGY

## 2024-07-18 PROCEDURE — 258N000003 HC RX IP 258 OP 636: Performed by: NURSE ANESTHETIST, CERTIFIED REGISTERED

## 2024-07-18 PROCEDURE — 999N000141 HC STATISTIC PRE-PROCEDURE NURSING ASSESSMENT: Performed by: SURGERY

## 2024-07-18 PROCEDURE — 258N000003 HC RX IP 258 OP 636: Performed by: ANESTHESIOLOGY

## 2024-07-18 PROCEDURE — 49650 LAP ING HERNIA REPAIR INIT: CPT | Mod: LT | Performed by: SURGERY

## 2024-07-18 PROCEDURE — 272N000001 HC OR GENERAL SUPPLY STERILE: Performed by: SURGERY

## 2024-07-18 PROCEDURE — 49650 LAP ING HERNIA REPAIR INIT: CPT | Performed by: ANESTHESIOLOGY

## 2024-07-18 PROCEDURE — C1781 MESH (IMPLANTABLE): HCPCS | Performed by: SURGERY

## 2024-07-18 PROCEDURE — 250N000011 HC RX IP 250 OP 636: Performed by: NURSE ANESTHETIST, CERTIFIED REGISTERED

## 2024-07-18 PROCEDURE — 370N000017 HC ANESTHESIA TECHNICAL FEE, PER MIN: Performed by: SURGERY

## 2024-07-18 PROCEDURE — 49650 LAP ING HERNIA REPAIR INIT: CPT | Performed by: NURSE ANESTHETIST, CERTIFIED REGISTERED

## 2024-07-18 PROCEDURE — S2900 ROBOTIC SURGICAL SYSTEM: HCPCS | Performed by: SURGERY

## 2024-07-18 PROCEDURE — 250N000009 HC RX 250: Performed by: SURGERY

## 2024-07-18 PROCEDURE — 360N000080 HC SURGERY LEVEL 7, PER MIN: Performed by: SURGERY

## 2024-07-18 PROCEDURE — 710N000012 HC RECOVERY PHASE 2, PER MINUTE: Performed by: SURGERY

## 2024-07-18 PROCEDURE — 250N000025 HC SEVOFLURANE, PER MIN: Performed by: SURGERY

## 2024-07-18 DEVICE — LAPAROSCOPIC SELF-FIXATING MESH POLYESTER WITH POLYLACTIC ACID GRIPS AND COLLAGEN FILM
Type: IMPLANTABLE DEVICE | Site: ABDOMEN | Status: FUNCTIONAL
Brand: PROGRIP

## 2024-07-18 RX ORDER — HYDROMORPHONE HYDROCHLORIDE 1 MG/ML
0.5 INJECTION, SOLUTION INTRAMUSCULAR; INTRAVENOUS; SUBCUTANEOUS EVERY 5 MIN PRN
Status: DISCONTINUED | OUTPATIENT
Start: 2024-07-18 | End: 2024-07-18 | Stop reason: HOSPADM

## 2024-07-18 RX ORDER — FENTANYL CITRATE 50 UG/ML
25 INJECTION, SOLUTION INTRAMUSCULAR; INTRAVENOUS
Status: DISCONTINUED | OUTPATIENT
Start: 2024-07-18 | End: 2024-07-18 | Stop reason: HOSPADM

## 2024-07-18 RX ORDER — LABETALOL HYDROCHLORIDE 5 MG/ML
5 INJECTION, SOLUTION INTRAVENOUS EVERY 10 MIN PRN
Status: DISCONTINUED | OUTPATIENT
Start: 2024-07-18 | End: 2024-07-18 | Stop reason: HOSPADM

## 2024-07-18 RX ORDER — CEFAZOLIN SODIUM/WATER 2 G/20 ML
2 SYRINGE (ML) INTRAVENOUS
Status: DISCONTINUED | OUTPATIENT
Start: 2024-07-18 | End: 2024-07-18 | Stop reason: HOSPADM

## 2024-07-18 RX ORDER — ACETAMINOPHEN 325 MG/1
650 TABLET ORAL EVERY 4 HOURS PRN
Qty: 50 TABLET | Refills: 0 | Status: SHIPPED | OUTPATIENT
Start: 2024-07-18

## 2024-07-18 RX ORDER — DEXAMETHASONE SODIUM PHOSPHATE 10 MG/ML
INJECTION, SOLUTION INTRAMUSCULAR; INTRAVENOUS PRN
Status: DISCONTINUED | OUTPATIENT
Start: 2024-07-18 | End: 2024-07-18

## 2024-07-18 RX ORDER — HYDROCODONE BITARTRATE AND ACETAMINOPHEN 5; 325 MG/1; MG/1
1-2 TABLET ORAL EVERY 4 HOURS PRN
Qty: 10 TABLET | Refills: 0 | Status: SHIPPED | OUTPATIENT
Start: 2024-07-18

## 2024-07-18 RX ORDER — OXYCODONE HYDROCHLORIDE 5 MG/1
10 TABLET ORAL
Status: DISCONTINUED | OUTPATIENT
Start: 2024-07-18 | End: 2024-07-18 | Stop reason: HOSPADM

## 2024-07-18 RX ORDER — MAGNESIUM SULFATE 4 G/50ML
4 INJECTION INTRAVENOUS ONCE
Status: COMPLETED | OUTPATIENT
Start: 2024-07-18 | End: 2024-07-18

## 2024-07-18 RX ORDER — ONDANSETRON 2 MG/ML
4 INJECTION INTRAMUSCULAR; INTRAVENOUS EVERY 30 MIN PRN
Status: DISCONTINUED | OUTPATIENT
Start: 2024-07-18 | End: 2024-07-18 | Stop reason: HOSPADM

## 2024-07-18 RX ORDER — SODIUM CHLORIDE, SODIUM LACTATE, POTASSIUM CHLORIDE, CALCIUM CHLORIDE 600; 310; 30; 20 MG/100ML; MG/100ML; MG/100ML; MG/100ML
INJECTION, SOLUTION INTRAVENOUS CONTINUOUS
Status: DISCONTINUED | OUTPATIENT
Start: 2024-07-18 | End: 2024-07-18 | Stop reason: HOSPADM

## 2024-07-18 RX ORDER — HYDROCODONE BITARTRATE AND ACETAMINOPHEN 5; 325 MG/1; MG/1
1-2 TABLET ORAL EVERY 4 HOURS PRN
Status: DISCONTINUED | OUTPATIENT
Start: 2024-07-18 | End: 2024-07-18 | Stop reason: HOSPADM

## 2024-07-18 RX ORDER — ACETAMINOPHEN 325 MG/1
975 TABLET ORAL ONCE
Status: COMPLETED | OUTPATIENT
Start: 2024-07-18 | End: 2024-07-18

## 2024-07-18 RX ORDER — NALOXONE HYDROCHLORIDE 0.4 MG/ML
0.1 INJECTION, SOLUTION INTRAMUSCULAR; INTRAVENOUS; SUBCUTANEOUS
Status: DISCONTINUED | OUTPATIENT
Start: 2024-07-18 | End: 2024-07-18 | Stop reason: HOSPADM

## 2024-07-18 RX ORDER — DEXAMETHASONE SODIUM PHOSPHATE 4 MG/ML
4 INJECTION, SOLUTION INTRA-ARTICULAR; INTRALESIONAL; INTRAMUSCULAR; INTRAVENOUS; SOFT TISSUE
Status: DISCONTINUED | OUTPATIENT
Start: 2024-07-18 | End: 2024-07-18 | Stop reason: HOSPADM

## 2024-07-18 RX ORDER — IBUPROFEN 600 MG/1
600 TABLET, FILM COATED ORAL EVERY 6 HOURS PRN
Qty: 30 TABLET | Refills: 0 | Status: SHIPPED | OUTPATIENT
Start: 2024-07-18

## 2024-07-18 RX ORDER — CEFAZOLIN SODIUM/WATER 2 G/20 ML
2 SYRINGE (ML) INTRAVENOUS SEE ADMIN INSTRUCTIONS
Status: DISCONTINUED | OUTPATIENT
Start: 2024-07-18 | End: 2024-07-18 | Stop reason: HOSPADM

## 2024-07-18 RX ORDER — OXYCODONE HYDROCHLORIDE 5 MG/1
5 TABLET ORAL
Status: DISCONTINUED | OUTPATIENT
Start: 2024-07-18 | End: 2024-07-18 | Stop reason: HOSPADM

## 2024-07-18 RX ORDER — ONDANSETRON 4 MG/1
4 TABLET, ORALLY DISINTEGRATING ORAL EVERY 30 MIN PRN
Status: DISCONTINUED | OUTPATIENT
Start: 2024-07-18 | End: 2024-07-18 | Stop reason: HOSPADM

## 2024-07-18 RX ORDER — BUPIVACAINE HYDROCHLORIDE AND EPINEPHRINE 2.5; 5 MG/ML; UG/ML
INJECTION, SOLUTION INFILTRATION; PERINEURAL PRN
Status: DISCONTINUED | OUTPATIENT
Start: 2024-07-18 | End: 2024-07-18 | Stop reason: HOSPADM

## 2024-07-18 RX ORDER — KETAMINE HYDROCHLORIDE 10 MG/ML
INJECTION INTRAMUSCULAR; INTRAVENOUS PRN
Status: DISCONTINUED | OUTPATIENT
Start: 2024-07-18 | End: 2024-07-18

## 2024-07-18 RX ORDER — PROPOFOL 10 MG/ML
INJECTION, EMULSION INTRAVENOUS PRN
Status: DISCONTINUED | OUTPATIENT
Start: 2024-07-18 | End: 2024-07-18

## 2024-07-18 RX ORDER — LIDOCAINE 40 MG/G
CREAM TOPICAL
Status: DISCONTINUED | OUTPATIENT
Start: 2024-07-18 | End: 2024-07-18 | Stop reason: HOSPADM

## 2024-07-18 RX ORDER — SODIUM CHLORIDE, SODIUM LACTATE, POTASSIUM CHLORIDE, CALCIUM CHLORIDE 600; 310; 30; 20 MG/100ML; MG/100ML; MG/100ML; MG/100ML
INJECTION, SOLUTION INTRAVENOUS CONTINUOUS PRN
Status: DISCONTINUED | OUTPATIENT
Start: 2024-07-18 | End: 2024-07-18

## 2024-07-18 RX ORDER — DOCUSATE SODIUM 100 MG/1
100 CAPSULE, LIQUID FILLED ORAL 2 TIMES DAILY PRN
Qty: 20 CAPSULE | Refills: 0 | Status: SHIPPED | OUTPATIENT
Start: 2024-07-18

## 2024-07-18 RX ORDER — HYDROMORPHONE HCL IN WATER/PF 6 MG/30 ML
0.2 PATIENT CONTROLLED ANALGESIA SYRINGE INTRAVENOUS EVERY 5 MIN PRN
Status: DISCONTINUED | OUTPATIENT
Start: 2024-07-18 | End: 2024-07-18 | Stop reason: HOSPADM

## 2024-07-18 RX ORDER — ONDANSETRON 2 MG/ML
INJECTION INTRAMUSCULAR; INTRAVENOUS PRN
Status: DISCONTINUED | OUTPATIENT
Start: 2024-07-18 | End: 2024-07-18

## 2024-07-18 RX ADMIN — HYDROMORPHONE HYDROCHLORIDE 1 MG: 1 INJECTION, SOLUTION INTRAMUSCULAR; INTRAVENOUS; SUBCUTANEOUS at 14:03

## 2024-07-18 RX ADMIN — PROCHLORPERAZINE EDISYLATE 5 MG: 5 INJECTION INTRAMUSCULAR; INTRAVENOUS at 16:38

## 2024-07-18 RX ADMIN — PHENYLEPHRINE HYDROCHLORIDE 100 MCG: 10 INJECTION INTRAVENOUS at 15:28

## 2024-07-18 RX ADMIN — MAGNESIUM SULFATE HEPTAHYDRATE 4 G: 80 INJECTION, SOLUTION INTRAVENOUS at 11:00

## 2024-07-18 RX ADMIN — HYDROCODONE BITARTRATE AND ACETAMINOPHEN 1 TABLET: 5; 325 TABLET ORAL at 18:17

## 2024-07-18 RX ADMIN — KETAMINE HYDROCHLORIDE 50 MG: 10 INJECTION INTRAMUSCULAR; INTRAVENOUS at 14:06

## 2024-07-18 RX ADMIN — Medication 2 G: at 13:57

## 2024-07-18 RX ADMIN — SUGAMMADEX 200 MG: 100 INJECTION, SOLUTION INTRAVENOUS at 15:55

## 2024-07-18 RX ADMIN — PHENYLEPHRINE HYDROCHLORIDE 150 MCG: 10 INJECTION INTRAVENOUS at 14:34

## 2024-07-18 RX ADMIN — SODIUM CHLORIDE, POTASSIUM CHLORIDE, SODIUM LACTATE AND CALCIUM CHLORIDE: 600; 310; 30; 20 INJECTION, SOLUTION INTRAVENOUS at 14:47

## 2024-07-18 RX ADMIN — SODIUM CHLORIDE, POTASSIUM CHLORIDE, SODIUM LACTATE AND CALCIUM CHLORIDE: 600; 310; 30; 20 INJECTION, SOLUTION INTRAVENOUS at 10:56

## 2024-07-18 RX ADMIN — ROCURONIUM BROMIDE 10 MG: 50 INJECTION, SOLUTION INTRAVENOUS at 15:34

## 2024-07-18 RX ADMIN — ONDANSETRON 4 MG: 2 INJECTION INTRAMUSCULAR; INTRAVENOUS at 15:46

## 2024-07-18 RX ADMIN — DEXAMETHASONE SODIUM PHOSPHATE 10 MG: 10 INJECTION, SOLUTION INTRAMUSCULAR; INTRAVENOUS at 14:10

## 2024-07-18 RX ADMIN — SODIUM CHLORIDE, POTASSIUM CHLORIDE, SODIUM LACTATE AND CALCIUM CHLORIDE: 600; 310; 30; 20 INJECTION, SOLUTION INTRAVENOUS at 13:36

## 2024-07-18 RX ADMIN — ROCURONIUM BROMIDE 50 MG: 50 INJECTION, SOLUTION INTRAVENOUS at 14:04

## 2024-07-18 RX ADMIN — PROPOFOL 200 MG: 10 INJECTION, EMULSION INTRAVENOUS at 14:03

## 2024-07-18 RX ADMIN — ACETAMINOPHEN 975 MG: 325 TABLET ORAL at 10:57

## 2024-07-18 ASSESSMENT — ACTIVITIES OF DAILY LIVING (ADL)
ADLS_ACUITY_SCORE: 18
ADLS_ACUITY_SCORE: 35
ADLS_ACUITY_SCORE: 18

## 2024-07-18 NOTE — ANESTHESIA PREPROCEDURE EVALUATION
Anesthesia Pre-Procedure Evaluation    Patient: Bishnu Tam   MRN: [id number] : 1960        Procedure : Procedure(s):  HERNIORRHAPHY, INGUINAL, ROBOT-ASSISTED, LAPAROSCOPIC, USING DA GLEN XI          Past Medical History:   Diagnosis Date    Appendicitis 2019    Cataract     Diabetes mellitus (H)     Dyslipidemia     History of blood clots     Hypertension     Thrombophilia (H24)       Past Surgical History:   Procedure Laterality Date    APPENDECTOMY  2019    CATARACT EXTRACTION Bilateral     HERNIA REPAIR  2008    [facility]     OTHER SURGICAL HISTORY      melanoma    FL LAP, VENTRAL HERNIA REPAIR,REDUCIBLE N/A 2020    Procedure: HERNIORRHAPHY, VENTRAL, LAPAROSCOPIC;  Surgeon: Josef Em MD;  Location: [facility];  Service: General    FL LAP,APPENDECTOMY N/A 2019    Procedure: APPENDECTOMY, LAPAROSCOPIC;  Surgeon: Josef Em MD;  Location: [facility];  Service: General      No Known Allergies   Social History     Tobacco Use    Smoking status: Never    Smokeless tobacco: Never   Substance Use Topics    Alcohol use: Yes     Alcohol/week: 6.0 standard drinks of alcohol      Wt Readings from Last 1 Encounters:   24 84.2 kg (185 lb 11.2 oz)        Anesthesia Evaluation            ROS/MED HX  ENT/Pulmonary:  - neg pulmonary ROS     Neurologic:  - neg neurologic ROS     Cardiovascular:     (+)  hypertension- -   -  - -                                      METS/Exercise Tolerance: >4 METS    Hematologic:     (+) History of blood clots (HELD),    pt is anticoagulated,           Musculoskeletal:       GI/Hepatic:  - neg GI/hepatic ROS     Renal/Genitourinary:       Endo:     (+)  type II DM,   Not using insulin,                 Psychiatric/Substance Use:       Infectious Disease:       Malignancy:       Other:  - neg other ROS          Physical Exam    Airway        Mallampati: III   TM distance: > 3 FB   Neck ROM: full   Mouth opening: > 3  "cm    Respiratory Devices and Support         Dental       (+) Modest Abnormalities - crowns, retainers, 1 or 2 missing teeth      Cardiovascular          Rhythm and rate: regular     Pulmonary           breath sounds clear to auscultation           OUTSIDE LABS:  CBC:   Lab Results   Component Value Date    WBC 7.0 06/02/2019    WBC 9.2 06/01/2019    HGB 13.2 (L) 06/02/2019    HGB 16.2 06/01/2019    HCT 37.8 (L) 06/02/2019    HCT 45.6 06/01/2019     06/02/2019     06/01/2019     BMP:   Lab Results   Component Value Date     08/31/2023     07/08/2022    POTASSIUM 4.5 08/31/2023    POTASSIUM 4.0 07/08/2022    CHLORIDE 104 08/31/2023    CHLORIDE 105 07/08/2022    CO2 26 08/31/2023    CO2 25 07/08/2022    BUN 18.9 08/31/2023    BUN 14.4 07/08/2022    CR 0.90 08/31/2023    CR 0.77 07/08/2022     (H) 07/18/2024     (H) 08/31/2023     COAGS:   Lab Results   Component Value Date    PTT 31 06/01/2019    INR 1.36 (H) 06/01/2019     POC: No results found for: \"BGM\", \"HCG\", \"HCGS\"  HEPATIC:   Lab Results   Component Value Date    ALBUMIN 4.3 08/31/2023    PROTTOTAL 6.2 (L) 08/31/2023    ALT 26 08/31/2023    AST 21 08/31/2023    ALKPHOS 72 08/31/2023    BILITOTAL 1.4 (H) 08/31/2023     OTHER:   Lab Results   Component Value Date    LACT 1.9 06/01/2019    A1C 6.0 06/02/2019    CHARLOTTE 9.6 08/31/2023    MAG 2.2 06/02/2019    LIPASE <9 06/01/2019       Anesthesia Plan    ASA Status:  3    NPO Status:  NPO Appropriate    Anesthesia Type: General.     - Airway: ETT   Induction: Intravenous, Propofol.   Maintenance: Balanced.        Consents    Anesthesia Plan(s) and associated risks, benefits, and realistic alternatives discussed. Questions answered and patient/representative(s) expressed understanding.     - Discussed:     - Discussed with:  Patient            Postoperative Care    Pain management: Multi-modal analgesia.   PONV prophylaxis: Dexamethasone or Solumedrol, Ondansetron (or other " 5HT-3), Background Propofol Infusion     Comments:    Other Comments:     Dilaudid on induction  Ketamine  Robinul  Precedex boluses prn                   Adore Angelita Garcia MD              # Drug Induced Coagulation Defect: home medication list includes an anticoagulant medication

## 2024-07-18 NOTE — ANESTHESIA PROCEDURE NOTES
Airway       Patient location during procedure: OR       Procedure Start/Stop Times: 7/18/2024 2:07 PM  Staff -        CRNA: Crispin Francois APRN CRNA       Performed By: CRNA  Consent for Airway        Urgency: elective  Indications and Patient Condition       Indications for airway management: savita-procedural       Induction type:intravenous       Mask difficulty assessment: 1 - vent by mask    Final Airway Details       Final airway type: endotracheal airway       Successful airway: ETT - single  Endotracheal Airway Details        ETT size (mm): 7.5       Cuffed: yes       Successful intubation technique: video laryngoscopy       VL Blade Size: Glidescope 3       Grade View of Cords: 1       Adjucts: stylet       Position: Right       Measured from: lips       Secured at (cm): 22       Bite block used: None    Post intubation assessment        Placement verified by: capnometry, equal breath sounds and chest rise        Number of attempts at approach: 1       Secured with: tape       Ease of procedure: easy       Dentition: Intact and Unchanged       Dental guard used and removed. Dental Guard Type: Standard White.    Medication(s) Administered   Medication Administration Time: 7/18/2024 2:07 PM

## 2024-07-18 NOTE — ANESTHESIA CARE TRANSFER NOTE
Patient: Bishnu Tam    Procedure: Procedure(s):  HERNIORRHAPHY, INGUINAL, ROBOT-ASSISTED, LAPAROSCOPIC, USING DA GLEN XI, LYSIS OF ADHESIONS       Diagnosis: Left inguinal hernia [K40.90]  Diagnosis Additional Information: No value filed.    Anesthesia Type:   General     Note:    Oropharynx: oropharynx clear of all foreign objects and spontaneously breathing  Level of Consciousness: awake  Oxygen Supplementation: face mask  Level of Supplemental Oxygen (L/min / FiO2): 8  Independent Airway: airway patency satisfactory and stable  Dentition: dentition unchanged  Vital Signs Stable: post-procedure vital signs reviewed and stable    Patient transferred to: PACU    Handoff Report: Identifed the Patient, Identified the Reponsible Provider, Reviewed the pertinent medical history, Discussed the surgical course, Reviewed Intra-OP anesthesia mangement and issues during anesthesia, Set expectations for post-procedure period and Allowed opportunity for questions and acknowledgement of understanding      Vitals:  Vitals Value Taken Time   BP     Temp     Pulse 75 07/18/24 1608   Resp 20 07/18/24 1608   SpO2 98 % 07/18/24 1608   Vitals shown include unfiled device data.    Electronically Signed By: MADELIN Montgomery CRNA  July 18, 2024  4:09 PM

## 2024-07-18 NOTE — OP NOTE
Operative Note    Name:  Bishnu Tam  Location: [facility] Main OR  Procedure Date:  7/18/2024  PCP:  Tonny Arthur    Surgery Performed:  Procedure/Surgery Information   Procedure: Procedure(s):  HERNIORRHAPHY, INGUINAL, ROBOT-ASSISTED, LAPAROSCOPIC, USING DA GLEN XI   Surgeon(s): Surgeons and Role:     * Josef Em MD - Primary   Specimens: * No specimens in log *            Pre-Procedure Diagnosis:  Left inguinal hernia [K40.90]    Post-Procedure Diagnosis:    Left inguinal hernia [K40.90]    Anesthesia:  General Endotracheal Anesthesia     Findings:  A Large Indirect Left Sided Inguinal Hernia, with incarcerated sigmoid colon    Operative Report:    Patient is brought the operating placed supine position given general endotracheal anesthesia sterilely prepped and draped in the usual surgical fashion and the bed is flexed put in a Trendelenburg position.  Timeout process is undertaken.    The left upper quadrant was addressed with local anesthesia and an 8 mm incision.  The abdominal cavity was accessed with a 5 mm trocar under direct visualization with 0 degree laparoscope using the Optiview technique.  Pneumoperitoneum was brought to 15 mmHg.  There were a midline adhesions all across the supraumbilical region secondary to a prior ventral hernia repair.  I placed a second 5 mm trocar on the patient's left side and used scissors with occasional electrocautery to take down these anterior abdominal wall adhesions.    Tap blocks were done on the right and left side under direct visualization and then two 8 mm robotic trocars were brought in 1 in the left upper quadrant and 1 in the upper midline.  The initial 5 mm trocar was changed out for an 8 mm robotic trocar under direct visualization.  Looking down into the pelvis I could see if there was a large left inguinal hernia with sigmoid colon going down inside of it.  I used a Parvez grasper to apply traction to the sigmoid colon.  I reduced  some surrounding pericolic fat but I was unable to reduce the colon from this hernia defect.  This is consistent with an incarcerated left-sided inguinal hernia with sigmoid colon incarcerated inside the hernia defect.    We docked the robot.  I brought in fenestrated bipolar and a scissor.  I made a transverse incision across the peritoneum of the infraumbilical space extending out onto the patient's Left side.  I then dissected that peritoneal flap down towards the intra-abdominal space working in that preperitoneal plane down towards the inguinal floor.  I encountered the spermatic cord on that right side and dissected the hernia sac free from the hernia defect and then away from the spermatic cord.    I freed the tissues around the pubic tubercle and along Coopers Ligament moving out towards the internal inguinal ring.  This was a prominent left-sided inguinal hernia at defect and the hernia sac extended well down into the scrotum.  I followed that hernia sac down into the scrotum and is able to free it from all surrounding structures eventually after a significant amount of dissection as unable to peel the peritoneum back and away from the left inguinal space.  There was a small tear made in the peritoneum with this dissection.  That was closed with a running 3-0 V-Loc suture.      I then fit the large 16 x 12 cm ProGrip mesh into that space leaving nice overlap inferiorly such that the mesh would not push out into the hernia defect.  The peritoneal defect was then closed with a running 2-0 V lock suture.  The pneumoperitoneum was deflated trocars were removed the skin was closed with 4-0 subcuticular Monocryl sutures and the wounds were dressed with Telfa and Tegaderm.  The patient was extubated and taken to recovery.      Estimated Blood Loss:   5 cc       Drains:   None    Implants:  ProGrip preperitoneal inguinal hernia mesh    Complications:    None    Note to coders: This is a very difficult inguinal  hernia defect with incarcerated sigmoid colon out inside of the hernia itself.  It took significantly more time to repair this hernia given these constraints.  Add list the surgery with a modifier 22.    Josef Em MD     Date: 7/18/2024  Time: 2:07 PM

## 2024-07-18 NOTE — ANESTHESIA POSTPROCEDURE EVALUATION
Patient: Bishnu Tam    Procedure: Procedure(s):  HERNIORRHAPHY, INGUINAL, ROBOT-ASSISTED, LAPAROSCOPIC, USING DA GLEN XI, LYSIS OF ADHESIONS       Anesthesia Type:  General    Note:  Disposition: Outpatient   Postop Pain Control: Uneventful            Sign Out: Well controlled pain   PONV: No   Neuro/Psych: Uneventful            Sign Out: Acceptable/Baseline neuro status   Airway/Respiratory: Uneventful            Sign Out: Acceptable/Baseline resp. status   CV/Hemodynamics: Uneventful            Sign Out: Acceptable CV status; No obvious hypovolemia; No obvious fluid overload   Other NRE:    DID A NON-ROUTINE EVENT OCCUR?            Last vitals:  Vitals Value Taken Time   /59 07/18/24 1715   Temp 36.8  C (98.3  F) 07/18/24 1611   Pulse 68 07/18/24 1725   Resp 16 07/18/24 1725   SpO2 95 % 07/18/24 1725   Vitals shown include unfiled device data.    Electronically Signed By: Bran Mello MD  July 18, 2024  5:26 PM

## 2024-07-18 NOTE — H&P
HISTORY AND PHYSICAL UPDATE:    I have assessed the patient and evaluated the chart and and verify the patient's clinical status has not changed since our last documentation.  The patient is ready to move forward with the planned surgery.  Lungs: Clear to auscultation  Heart: Regular rate and rhythm    HPI:  This is a 64 year old male here today with concerns of pain and bulging in his Left groin. He has noted this for the past 3 years. The discomfort he is experiencing is a deep gnawing pain that is worse toward the end of the day.     Allergies:Patient has no known allergies.     Past Medical History        Past Medical History:   Diagnosis Date    Appendicitis 06/01/2019    Diabetes mellitus (H)      History of blood clots      Hypertension              Past Surgical History         Past Surgical History:   Procedure Laterality Date    APPENDECTOMY   06/01/2019    HERNIA REPAIR   2008     [facility]     OTHER SURGICAL HISTORY         melanoma    SD LAP, VENTRAL HERNIA REPAIR,REDUCIBLE N/A 9/24/2020     Procedure: HERNIORRHAPHY, VENTRAL, LAPAROSCOPIC;  Surgeon: Josef Em MD;  Location: [facility];  Service: General    SD LAP,APPENDECTOMY N/A 6/1/2019     Procedure: APPENDECTOMY, LAPAROSCOPIC;  Surgeon: Josef Em MD;  Location: [facility];  Service: General            CURRENT MEDS:  Current Outpatient Prescriptions          Current Outpatient Medications   Medication Sig Dispense Refill    atorvastatin (LIPITOR) 10 MG tablet Take 1 tablet by mouth daily at 2 pm        ELIQUIS ANTICOAGULANT 5 MG tablet          lisinopril (PRINIVIL,ZESTRIL) 2.5 MG tablet [LISINOPRIL (PRINIVIL,ZESTRIL) 2.5 MG TABLET] Take 2.5 mg by mouth daily.        metFORMIN (GLUCOPHAGE-XR) 500 MG 24 hr tablet [METFORMIN (GLUCOPHAGE-XR) 500 MG 24 HR TABLET] Take 500 mg by mouth daily with breakfast.                Family History         Family History   Problem Relation Age of Onset    Diabetes Mother                reports that he has never smoked. He has never used smokeless tobacco. He reports current alcohol use of about 6.0 standard drinks of alcohol per week. He reports that he does not use drugs.     Review of Systems -   10 point Review of systems is negative except for; as mentioned above in HPI and PMHx    Vitals: BP (!) 166/89 (BP Location: Right arm)   Pulse 68   Temp 97.8  F (36.6  C) (Oral)   Resp 16   Wt 84.2 kg (185 lb 11.2 oz)   SpO2 97%   BMI 27.03 kg/m    BMI= Body mass index is 27.03 kg/m .      EXAM:  GENERAL: Well developed male  HEENT: EOMI, Anicteric Sclera  NECK:  No masses, good flexion and extention of the neck  CARDIAC: RRR w/out murmur   CHEST/LUNG: Clear  ABDOMEN: Left inguinal hernia.   GENITAL: Both testicles descended without masses  NEURO: He is ambulatory with good strength in both legs.     IMAGES: CT of Abdomin shows a large LIH with colon down in the hernia defect.     Assessment/Plan: Pt with a Left inguinal hernia. I discussed this at length with him.  I went over conservative management as well as surgical treatment for hernias.  Robotic Left Inguinal hernia.    Josef Em  [facility]; surgeons  [phone]

## 2024-07-21 ENCOUNTER — HEALTH MAINTENANCE LETTER (OUTPATIENT)
Age: 64
End: 2024-07-21

## 2024-09-29 ENCOUNTER — HEALTH MAINTENANCE LETTER (OUTPATIENT)
Age: 64
End: 2024-09-29

## 2025-02-15 ENCOUNTER — HEALTH MAINTENANCE LETTER (OUTPATIENT)
Age: 65
End: 2025-02-15

## 2025-07-20 ENCOUNTER — HEALTH MAINTENANCE LETTER (OUTPATIENT)
Age: 65
End: 2025-07-20

## 2025-08-31 ENCOUNTER — HEALTH MAINTENANCE LETTER (OUTPATIENT)
Age: 65
End: 2025-08-31

## (undated) DEVICE — DAVINCI XI DRAPE COLUMN 470341

## (undated) DEVICE — DRAPE SHEET TABLE COVER KC 42301*

## (undated) DEVICE — LUBRICANT INST ELECTROLUBE EL101

## (undated) DEVICE — SOL WATER IRRIG 1000ML BOTTLE 2F7114

## (undated) DEVICE — CUSTOM PACK LAP CHOLE SBA5BLCHEA

## (undated) DEVICE — GLOVE BIOGEL PI ULTRATOUCH G SZ 8.0 42180

## (undated) DEVICE — TUBING SMOKE EVAC PNEUMOCLEAR HIGH FLOW 0620050250

## (undated) DEVICE — SU WND CLOSURE VLOC 90 3-0 15CML VLOCM0604

## (undated) DEVICE — SU MONOCRYL+ 4-0 18IN PS2 UND MCP496G

## (undated) DEVICE — DAVINCI XI DRAPE ARM 470015

## (undated) DEVICE — SU WND CLOSURE V-LOC 90 SZ 2-0 9" GS-22 VLOCM2145

## (undated) DEVICE — PREP CHLORAPREP 26ML TINTED HI-LITE ORANGE 930815

## (undated) DEVICE — DAVINCI HOT SHEARS TIP COVER  400180

## (undated) DEVICE — MARKER SURG SKIN 2 TIP STRL SPP99DT2AA

## (undated) DEVICE — SYR 50ML SLIP TIP W/O NDL 309654

## (undated) DEVICE — DRAPE U SPLIT 74X120" 29440

## (undated) DEVICE — SUTURE VICRYL+ 2-0 27IN SH UND VCP417H

## (undated) DEVICE — DECANTER VIAL 2006S

## (undated) DEVICE — NDL INSUFFLATION 13GA 120MM C2201

## (undated) DEVICE — DRSG TEGADERM 2 3/8X2 3/4" 1624W

## (undated) DEVICE — PROTECTOR ARM STANDARD ONE STEP

## (undated) DEVICE — DRAPE SHEET REV FOLD 3/4 9349

## (undated) DEVICE — GOWN XXL 9575

## (undated) DEVICE — DAVINCI XI OBTURATOR BLADELESS 8MM 470359

## (undated) DEVICE — ANTIFOG SOLUTION SEE SHARP 150M TROCAR SWABS 30978

## (undated) DEVICE — DRSG TELFA 3X4" 1050

## (undated) DEVICE — DAVINCI XI SEAL UNIVERSAL 5-12MM 470500

## (undated) DEVICE — BLADE KNIFE SURG 11 371111

## (undated) DEVICE — Device

## (undated) RX ORDER — PROPOFOL 10 MG/ML
INJECTION, EMULSION INTRAVENOUS
Status: DISPENSED
Start: 2024-07-18

## (undated) RX ORDER — BUPIVACAINE HYDROCHLORIDE AND EPINEPHRINE 2.5; 5 MG/ML; UG/ML
INJECTION, SOLUTION INFILTRATION; PERINEURAL
Status: DISPENSED
Start: 2024-07-18